# Patient Record
Sex: MALE | Race: WHITE | Employment: FULL TIME | URBAN - METROPOLITAN AREA
[De-identification: names, ages, dates, MRNs, and addresses within clinical notes are randomized per-mention and may not be internally consistent; named-entity substitution may affect disease eponyms.]

---

## 2018-05-02 ENCOUNTER — HOSPITAL ENCOUNTER (EMERGENCY)
Age: 33
Discharge: HOME | End: 2018-05-02
Attending: EMERGENCY MEDICINE
Payer: MEDICAID

## 2018-05-02 VITALS
HEIGHT: 67 IN | BODY MASS INDEX: 28.72 KG/M2 | SYSTOLIC BLOOD PRESSURE: 148 MMHG | DIASTOLIC BLOOD PRESSURE: 89 MMHG | OXYGEN SATURATION: 96 % | WEIGHT: 183 LBS | RESPIRATION RATE: 16 BRPM | HEART RATE: 103 BPM | TEMPERATURE: 99.8 F

## 2018-05-02 DIAGNOSIS — J40 BRONCHITIS: Primary | ICD-10-CM

## 2018-05-02 PROCEDURE — 99282 EMERGENCY DEPT VISIT SF MDM: CPT

## 2018-05-02 RX ORDER — BENZONATATE 100 MG/1
100 CAPSULE ORAL 3 TIMES DAILY PRN
Qty: 21 CAPSULE | Refills: 0 | Status: SHIPPED | OUTPATIENT
Start: 2018-05-02 | End: 2018-05-09

## 2018-05-02 RX ORDER — AZITHROMYCIN 250 MG/1
TABLET, FILM COATED ORAL
Qty: 1 PACKET | Refills: 0 | Status: SHIPPED | OUTPATIENT
Start: 2018-05-02 | End: 2018-05-12

## 2018-05-02 RX ORDER — PREDNISONE 50 MG/1
TABLET ORAL
Qty: 5 TABLET | Refills: 0 | Status: SHIPPED | OUTPATIENT
Start: 2018-05-02 | End: 2018-09-04

## 2018-05-02 RX ORDER — ALBUTEROL SULFATE 90 UG/1
2 AEROSOL, METERED RESPIRATORY (INHALATION) EVERY 4 HOURS PRN
Qty: 1 INHALER | Refills: 1 | Status: SHIPPED | OUTPATIENT
Start: 2018-05-02 | End: 2018-09-04

## 2018-05-02 ASSESSMENT — PAIN DESCRIPTION - PAIN TYPE: TYPE: ACUTE PAIN

## 2018-05-02 ASSESSMENT — PAIN SCALES - GENERAL: PAINLEVEL_OUTOF10: 10

## 2018-05-02 ASSESSMENT — PAIN DESCRIPTION - LOCATION: LOCATION: THROAT

## 2018-05-08 ENCOUNTER — HOSPITAL ENCOUNTER (EMERGENCY)
Age: 33
Discharge: HOME | End: 2018-05-09
Attending: EMERGENCY MEDICINE
Payer: MEDICAID

## 2018-05-08 ENCOUNTER — APPOINTMENT (OUTPATIENT)
Dept: GENERAL RADIOLOGY | Age: 33
End: 2018-05-08
Payer: MEDICAID

## 2018-05-08 VITALS
WEIGHT: 183 LBS | OXYGEN SATURATION: 95 % | SYSTOLIC BLOOD PRESSURE: 128 MMHG | HEIGHT: 67 IN | BODY MASS INDEX: 28.72 KG/M2 | RESPIRATION RATE: 16 BRPM | TEMPERATURE: 98.4 F | HEART RATE: 79 BPM | DIASTOLIC BLOOD PRESSURE: 68 MMHG

## 2018-05-08 DIAGNOSIS — J20.8 ACUTE BRONCHITIS DUE TO OTHER SPECIFIED ORGANISMS: Primary | ICD-10-CM

## 2018-05-08 PROCEDURE — 99283 EMERGENCY DEPT VISIT LOW MDM: CPT

## 2018-05-08 PROCEDURE — 71046 X-RAY EXAM CHEST 2 VIEWS: CPT

## 2018-05-08 PROCEDURE — 6370000000 HC RX 637 (ALT 250 FOR IP): Performed by: EMERGENCY MEDICINE

## 2018-05-08 RX ORDER — DOXYCYCLINE HYCLATE 100 MG/1
100 CAPSULE ORAL ONCE
Status: COMPLETED | OUTPATIENT
Start: 2018-05-08 | End: 2018-05-08

## 2018-05-08 RX ORDER — CODEINE PHOSPHATE AND GUAIFENESIN 10; 100 MG/5ML; MG/5ML
10 SOLUTION ORAL ONCE
Status: COMPLETED | OUTPATIENT
Start: 2018-05-08 | End: 2018-05-08

## 2018-05-08 RX ORDER — IPRATROPIUM BROMIDE AND ALBUTEROL SULFATE 2.5; .5 MG/3ML; MG/3ML
1 SOLUTION RESPIRATORY (INHALATION)
Status: COMPLETED | OUTPATIENT
Start: 2018-05-08 | End: 2018-05-09

## 2018-05-08 RX ADMIN — GUAIFENESIN AND CODEINE PHOSPHATE 10 ML: 10; 100 LIQUID ORAL at 23:50

## 2018-05-08 RX ADMIN — DOXYCYCLINE HYCLATE 100 MG: 100 CAPSULE, GELATIN COATED ORAL at 23:50

## 2018-05-08 ASSESSMENT — PAIN DESCRIPTION - LOCATION: LOCATION: CHEST;RIB CAGE;THROAT

## 2018-05-08 ASSESSMENT — PAIN SCALES - GENERAL: PAINLEVEL_OUTOF10: 10

## 2018-05-08 ASSESSMENT — PAIN DESCRIPTION - ONSET: ONSET: ON-GOING

## 2018-05-08 ASSESSMENT — PAIN DESCRIPTION - DESCRIPTORS: DESCRIPTORS: ACHING;CONSTANT;DISCOMFORT

## 2018-05-08 ASSESSMENT — PAIN DESCRIPTION - FREQUENCY: FREQUENCY: CONTINUOUS

## 2018-05-08 ASSESSMENT — PAIN DESCRIPTION - PROGRESSION: CLINICAL_PROGRESSION: NOT CHANGED

## 2018-05-08 ASSESSMENT — PAIN DESCRIPTION - PAIN TYPE: TYPE: ACUTE PAIN

## 2018-05-09 PROBLEM — J20.8 ACUTE BRONCHITIS DUE TO OTHER SPECIFIED ORGANISMS: Status: ACTIVE | Noted: 2018-05-09

## 2018-05-09 PROCEDURE — 6370000000 HC RX 637 (ALT 250 FOR IP): Performed by: EMERGENCY MEDICINE

## 2018-05-09 RX ORDER — GUAIFENESIN AND CODEINE PHOSPHATE 100; 10 MG/5ML; MG/5ML
10 SOLUTION ORAL 3 TIMES DAILY PRN
Qty: 200 ML | Refills: 0 | Status: SHIPPED | OUTPATIENT
Start: 2018-05-09 | End: 2018-05-16

## 2018-05-09 RX ORDER — DOXYCYCLINE HYCLATE 100 MG
100 TABLET ORAL 2 TIMES DAILY
Qty: 20 TABLET | Refills: 0 | Status: SHIPPED | OUTPATIENT
Start: 2018-05-09 | End: 2018-05-19

## 2018-05-09 RX ADMIN — IPRATROPIUM BROMIDE AND ALBUTEROL SULFATE 1 AMPULE: .5; 3 SOLUTION RESPIRATORY (INHALATION) at 00:45

## 2018-05-09 RX ADMIN — IPRATROPIUM BROMIDE AND ALBUTEROL SULFATE 1 AMPULE: .5; 3 SOLUTION RESPIRATORY (INHALATION) at 00:30

## 2018-05-09 RX ADMIN — IPRATROPIUM BROMIDE AND ALBUTEROL SULFATE 1 AMPULE: .5; 3 SOLUTION RESPIRATORY (INHALATION) at 00:12

## 2018-09-04 ENCOUNTER — APPOINTMENT (OUTPATIENT)
Dept: GENERAL RADIOLOGY | Age: 33
End: 2018-09-04
Payer: MEDICAID

## 2018-09-04 ENCOUNTER — APPOINTMENT (OUTPATIENT)
Dept: CT IMAGING | Age: 33
End: 2018-09-04
Payer: MEDICAID

## 2018-09-04 ENCOUNTER — HOSPITAL ENCOUNTER (EMERGENCY)
Age: 33
Discharge: HOME | End: 2018-09-04
Attending: EMERGENCY MEDICINE
Payer: MEDICAID

## 2018-09-04 VITALS
RESPIRATION RATE: 16 BRPM | OXYGEN SATURATION: 100 % | BODY MASS INDEX: 25.11 KG/M2 | WEIGHT: 160 LBS | DIASTOLIC BLOOD PRESSURE: 84 MMHG | HEIGHT: 67 IN | HEART RATE: 98 BPM | SYSTOLIC BLOOD PRESSURE: 117 MMHG | TEMPERATURE: 97.8 F

## 2018-09-04 DIAGNOSIS — F41.1 ANXIETY STATE: ICD-10-CM

## 2018-09-04 DIAGNOSIS — R10.10 PAIN OF UPPER ABDOMEN: Primary | ICD-10-CM

## 2018-09-04 LAB
ALBUMIN SERPL-MCNC: 4.6 G/DL (ref 3.5–5.2)
ALP BLD-CCNC: 62 U/L (ref 40–129)
ALT SERPL-CCNC: 80 U/L (ref 0–40)
ANION GAP SERPL CALCULATED.3IONS-SCNC: 15 MMOL/L (ref 7–16)
AST SERPL-CCNC: 51 U/L (ref 0–39)
BASOPHILS ABSOLUTE: 0.12 E9/L (ref 0–0.2)
BASOPHILS RELATIVE PERCENT: 1.5 % (ref 0–2)
BILIRUB SERPL-MCNC: 0.4 MG/DL (ref 0–1.2)
BUN BLDV-MCNC: 6 MG/DL (ref 6–20)
CALCIUM SERPL-MCNC: 8.8 MG/DL (ref 8.6–10.2)
CHLORIDE BLD-SCNC: 92 MMOL/L (ref 98–107)
CO2: 27 MMOL/L (ref 22–29)
CREAT SERPL-MCNC: 1.1 MG/DL (ref 0.7–1.2)
EKG ATRIAL RATE: 110 BPM
EKG P AXIS: 61 DEGREES
EKG P-R INTERVAL: 164 MS
EKG Q-T INTERVAL: 348 MS
EKG QRS DURATION: 94 MS
EKG QTC CALCULATION (BAZETT): 470 MS
EKG R AXIS: 34 DEGREES
EKG T AXIS: 57 DEGREES
EKG VENTRICULAR RATE: 110 BPM
EOSINOPHILS ABSOLUTE: 0.11 E9/L (ref 0.05–0.5)
EOSINOPHILS RELATIVE PERCENT: 1.4 % (ref 0–6)
GFR AFRICAN AMERICAN: >60
GFR NON-AFRICAN AMERICAN: >60 ML/MIN/1.73
GLUCOSE BLD-MCNC: 91 MG/DL (ref 74–109)
HCT VFR BLD CALC: 39.5 % (ref 37–54)
HEMOGLOBIN: 14.1 G/DL (ref 12.5–16.5)
IMMATURE GRANULOCYTES #: 0.02 E9/L
IMMATURE GRANULOCYTES %: 0.3 % (ref 0–5)
LYMPHOCYTES ABSOLUTE: 3.2 E9/L (ref 1.5–4)
LYMPHOCYTES RELATIVE PERCENT: 40.6 % (ref 20–42)
MCH RBC QN AUTO: 31.9 PG (ref 26–35)
MCHC RBC AUTO-ENTMCNC: 35.7 % (ref 32–34.5)
MCV RBC AUTO: 89.4 FL (ref 80–99.9)
MONOCYTES ABSOLUTE: 0.72 E9/L (ref 0.1–0.95)
MONOCYTES RELATIVE PERCENT: 9.1 % (ref 2–12)
NEUTROPHILS ABSOLUTE: 3.71 E9/L (ref 1.8–7.3)
NEUTROPHILS RELATIVE PERCENT: 47.1 % (ref 43–80)
PDW BLD-RTO: 12.1 FL (ref 11.5–15)
PLATELET # BLD: 225 E9/L (ref 130–450)
PMV BLD AUTO: 9.7 FL (ref 7–12)
POTASSIUM SERPL-SCNC: 2.9 MMOL/L (ref 3.5–5)
RBC # BLD: 4.42 E12/L (ref 3.8–5.8)
SODIUM BLD-SCNC: 134 MMOL/L (ref 132–146)
TOTAL PROTEIN: 7.7 G/DL (ref 6.4–8.3)
TROPONIN: <0.01 NG/ML (ref 0–0.03)
WBC # BLD: 7.9 E9/L (ref 4.5–11.5)

## 2018-09-04 PROCEDURE — 85025 COMPLETE CBC W/AUTO DIFF WBC: CPT

## 2018-09-04 PROCEDURE — 6370000000 HC RX 637 (ALT 250 FOR IP): Performed by: EMERGENCY MEDICINE

## 2018-09-04 PROCEDURE — 36415 COLL VENOUS BLD VENIPUNCTURE: CPT

## 2018-09-04 PROCEDURE — 80053 COMPREHEN METABOLIC PANEL: CPT

## 2018-09-04 PROCEDURE — 6360000002 HC RX W HCPCS

## 2018-09-04 PROCEDURE — 74176 CT ABD & PELVIS W/O CONTRAST: CPT

## 2018-09-04 PROCEDURE — 71250 CT THORAX DX C-: CPT

## 2018-09-04 PROCEDURE — 99285 EMERGENCY DEPT VISIT HI MDM: CPT

## 2018-09-04 PROCEDURE — 96374 THER/PROPH/DIAG INJ IV PUSH: CPT

## 2018-09-04 PROCEDURE — 84484 ASSAY OF TROPONIN QUANT: CPT

## 2018-09-04 PROCEDURE — 71045 X-RAY EXAM CHEST 1 VIEW: CPT

## 2018-09-04 RX ORDER — DOCUSATE SODIUM 100 MG/1
100 CAPSULE, LIQUID FILLED ORAL 2 TIMES DAILY
Qty: 10 CAPSULE | Refills: 0 | Status: SHIPPED | OUTPATIENT
Start: 2018-09-04 | End: 2018-09-09

## 2018-09-04 RX ORDER — LORAZEPAM 2 MG/ML
INJECTION INTRAMUSCULAR
Status: COMPLETED
Start: 2018-09-04 | End: 2018-09-04

## 2018-09-04 RX ORDER — LORAZEPAM 1 MG/1
1 TABLET ORAL EVERY 6 HOURS PRN
Qty: 6 TABLET | Refills: 0 | Status: SHIPPED | OUTPATIENT
Start: 2018-09-04 | End: 2018-09-07

## 2018-09-04 RX ORDER — LORAZEPAM 2 MG/ML
1 INJECTION INTRAMUSCULAR ONCE
Status: COMPLETED | OUTPATIENT
Start: 2018-09-04 | End: 2018-09-04

## 2018-09-04 RX ORDER — BUPRENORPHINE AND NALOXONE 8; 2 MG/1; MG/1
1 FILM, SOLUBLE BUCCAL; SUBLINGUAL DAILY
COMMUNITY

## 2018-09-04 RX ORDER — POTASSIUM CHLORIDE 20 MEQ/1
40 TABLET, EXTENDED RELEASE ORAL ONCE
Status: COMPLETED | OUTPATIENT
Start: 2018-09-04 | End: 2018-09-04

## 2018-09-04 RX ORDER — FAMOTIDINE 20 MG/1
20 TABLET, FILM COATED ORAL 2 TIMES DAILY
Qty: 14 TABLET | Refills: 0 | Status: SHIPPED | OUTPATIENT
Start: 2018-09-04 | End: 2018-09-11

## 2018-09-04 RX ADMIN — LORAZEPAM 1 MG: 2 INJECTION INTRAMUSCULAR at 05:17

## 2018-09-04 RX ADMIN — LORAZEPAM 1 MG: 2 INJECTION INTRAMUSCULAR; INTRAVENOUS at 05:17

## 2018-09-04 RX ADMIN — POTASSIUM CHLORIDE 40 MEQ: 20 TABLET, EXTENDED RELEASE ORAL at 06:30

## 2018-09-04 ASSESSMENT — PAIN DESCRIPTION - PAIN TYPE: TYPE: ACUTE PAIN

## 2018-09-04 ASSESSMENT — PAIN DESCRIPTION - ONSET: ONSET: PROGRESSIVE

## 2018-09-04 ASSESSMENT — PAIN DESCRIPTION - ORIENTATION: ORIENTATION: MID;LEFT

## 2018-09-04 ASSESSMENT — PAIN SCALES - GENERAL: PAINLEVEL_OUTOF10: 10

## 2018-09-04 ASSESSMENT — PAIN DESCRIPTION - DESCRIPTORS: DESCRIPTORS: ACHING;CONSTANT

## 2018-09-04 ASSESSMENT — PAIN DESCRIPTION - LOCATION: LOCATION: CHEST

## 2018-09-04 ASSESSMENT — PAIN DESCRIPTION - FREQUENCY: FREQUENCY: CONTINUOUS

## 2019-04-25 ENCOUNTER — HOSPITAL ENCOUNTER (EMERGENCY)
Age: 34
Discharge: HOME | End: 2019-04-25
Attending: EMERGENCY MEDICINE
Payer: MEDICAID

## 2019-04-25 VITALS
DIASTOLIC BLOOD PRESSURE: 87 MMHG | HEART RATE: 126 BPM | WEIGHT: 187 LBS | HEIGHT: 68 IN | BODY MASS INDEX: 28.34 KG/M2 | TEMPERATURE: 97.5 F | SYSTOLIC BLOOD PRESSURE: 140 MMHG | RESPIRATION RATE: 18 BRPM | OXYGEN SATURATION: 93 %

## 2019-04-25 DIAGNOSIS — T40.601A NARCOTIC OVERDOSE, ACCIDENTAL OR UNINTENTIONAL, INITIAL ENCOUNTER (HCC): Primary | ICD-10-CM

## 2019-04-25 PROCEDURE — 99284 EMERGENCY DEPT VISIT MOD MDM: CPT

## 2019-04-25 ASSESSMENT — ENCOUNTER SYMPTOMS
DIARRHEA: 0
NAUSEA: 0
SINUS PRESSURE: 0
SORE THROAT: 0
EYE DISCHARGE: 0
SHORTNESS OF BREATH: 0
VOMITING: 0
WHEEZING: 0
BACK PAIN: 0
EYE REDNESS: 0
EYE PAIN: 0
COUGH: 0
ABDOMINAL PAIN: 0
INGESTION: 1

## 2019-04-26 NOTE — ED NOTES
Patient AOx4, he is answering questions and responding appropriately. Patient refusing to allow RN to assess him and refusing treatment. Patient is waiting in room for a ride from his brother.       Marva Menjivar RN  04/25/19 7342

## 2019-04-26 NOTE — ED NOTES
Bed: 08  Expected date:   Expected time:   Means of arrival:   Comments:  EMS overdose     [place], [facility]  04/25/19 2053

## 2019-04-26 NOTE — ED PROVIDER NOTES
74-year-old male presents to the emergency department after an accidental overdose. Patient was at [employer] and had drank a couple beers was watching the [place] draft when he said he was having a headache so he took a friend's Opana pain medication he was became unresponsive and received a dose of Narcan which immediately let him to become alert and awake and had no difficulty breathing. States this is an accidental overdose. He denies suicidal or homicidal intention he states he does not want to stay because he is fine and is annoyed that he is here. States his brother is in the parking lot waiting for him. The history is provided by the patient. Ingestion   Ingested substance:  Prescription medication  Time since incident:  20 minutes  Ingestion amount:  1 Opana pill  Witnesses present: no    Called poison control: no    Incident location:  Party  Context: intentional ingestion    Context: not suicide attempt    Associated symptoms: no abdominal pain, no chest pain, no cough, no diarrhea, no headaches, no nausea, no shortness of breath and no vomiting        Review of Systems   Constitutional: Negative for chills and fever. HENT: Negative for ear pain, sinus pressure and sore throat. Eyes: Negative for pain, discharge and redness. Respiratory: Negative for cough, shortness of breath and wheezing. Cardiovascular: Negative for chest pain. Gastrointestinal: Negative for abdominal pain, diarrhea, nausea and vomiting. Genitourinary: Negative for dysuria and frequency. Musculoskeletal: Negative for arthralgias and back pain. Skin: Negative for rash and wound. Neurological: Negative for weakness and headaches. Hematological: Negative for adenopathy. All other systems reviewed and are negative. Physical Exam   Constitutional: He is oriented to person, place, and time. He appears well-developed and well-nourished. No distress. HENT:   Head: Normocephalic and atraumatic.    Eyes: Pupils are equal, round, and reactive to light. EOM are normal.   Neck: Normal range of motion. Neck supple. Cardiovascular: Normal rate and regular rhythm. Pulmonary/Chest: Effort normal and breath sounds normal.   Abdominal: Soft. Bowel sounds are normal.   Musculoskeletal: Normal range of motion. He exhibits no edema. Neurological: He is alert and oriented to person, place, and time. Psychiatric: He expresses no homicidal and no suicidal ideation. He expresses no suicidal plans and no homicidal plans. Procedures    MDM    ED Course as of Apr 25 2110   Thu Apr 25, 2019 2107 She has seen and examined patient appears to be an accident overdose of Opana. Patient is alert and awake and oriented and answering questions appropriately. He states he does not want to be seen and evaluated. Patient states he has no homicidal or suicidal ideation. .  Discussed because the patient had a overdose is possible that the symptoms could return especially since Opana last longer nervous system than Narcan. This could lead to the overdose like symptoms returning patient states understanding of this he also is explained that if he is to return to overdose like symptoms this could lead to, or possibly death. Patient again states understanding this and is willing to sign [employer] paperwork. Agent signed [employer] paperwork and went home with his brother who was available in the emergency department. [CF]   2109 ATTENDING PROVIDER ATTESTATION:     Augustine Boogie presented to the emergency department for evaluation of [unfilled] and was initially evaluated by the Medical Resident. See Original ED Note for H&P and ED course above. I have reviewed and discussed the case, including pertinent history (medical, surgical, family and social) and exam findings with the Medical Resident assigned to Augustine Boogie.   I have personally performed and/or participated in the history, exam, medical decision making, and diagnosis and prognosis. Their questions are answered, however they are not agreeable to admission.     --------------------------------- ADDITIONAL PROVIDER NOTES ---------------------------------  This patient has chosen to leave against medical advice. I have personally explained to them that choosing to do so may result in permanent bodily harm or death. I discussed at length that without further evaluation and monitoring there may be unforeseen circumstances and deterioration resulting in permanent bodily harm or death as a result of their choice. They are alert, oriented, and competent at this time. They state that they are aware of the serious risks as explained, but they continue to wish to leave against medical   advice. In light of their decision to leave against medical advice, follow-up has been arranged and they are aware of the importance of following up as instructed. They have been advised that they should return to the ED immediately if they change their mind at any time, or if their condition begins to change or worsen. The follow up plan has been discussed in detail and they are aware of the specific conditions for emergent return, as well as the importance of follow-up. New Prescriptions    No medications on file       Diagnosis:  1. Narcotic overdose, accidental or unintentional, initial encounter [facility])        Disposition:  Patient's disposition: Left against medical advice. Patient's condition is stable.        Aston Horn DO  Resident  04/25/19 1614

## 2019-04-26 NOTE — ED NOTES
Patient left room and is waiting outside for a ride from his brother. Merary Brandon witnessed that patient called brother for a ride home.       Lawson Felty, RN  04/25/19 9167

## 2019-08-18 ENCOUNTER — HOSPITAL ENCOUNTER (EMERGENCY)
Age: 34
Discharge: HOME | End: 2019-08-18
Payer: MEDICAID

## 2019-08-18 VITALS
HEIGHT: 68 IN | SYSTOLIC BLOOD PRESSURE: 118 MMHG | OXYGEN SATURATION: 94 % | TEMPERATURE: 98.2 F | WEIGHT: 170 LBS | BODY MASS INDEX: 25.76 KG/M2 | DIASTOLIC BLOOD PRESSURE: 68 MMHG | HEART RATE: 103 BPM | RESPIRATION RATE: 16 BRPM

## 2019-08-18 DIAGNOSIS — Z91.89 AT RISK FOR METHICILLIN RESISTANT STAPHYLOCOCCUS AUREUS INFECTION: Primary | ICD-10-CM

## 2019-08-18 DIAGNOSIS — L02.91 ABSCESS: ICD-10-CM

## 2019-08-18 PROCEDURE — 6370000000 HC RX 637 (ALT 250 FOR IP)

## 2019-08-18 PROCEDURE — 99282 EMERGENCY DEPT VISIT SF MDM: CPT

## 2019-08-18 PROCEDURE — 6370000000 HC RX 637 (ALT 250 FOR IP): Performed by: NURSE PRACTITIONER

## 2019-08-18 RX ORDER — SULFAMETHOXAZOLE AND TRIMETHOPRIM 800; 160 MG/1; MG/1
2 TABLET ORAL 2 TIMES DAILY
Qty: 40 TABLET | Refills: 0 | Status: SHIPPED | OUTPATIENT
Start: 2019-08-18 | End: 2019-08-28

## 2019-08-18 RX ORDER — SULFAMETHOXAZOLE AND TRIMETHOPRIM 800; 160 MG/1; MG/1
2 TABLET ORAL ONCE
Status: COMPLETED | OUTPATIENT
Start: 2019-08-18 | End: 2019-08-18

## 2019-08-18 RX ORDER — DIAPER,BRIEF,INFANT-TODD,DISP
EACH MISCELLANEOUS
Status: COMPLETED
Start: 2019-08-18 | End: 2019-08-18

## 2019-08-18 RX ADMIN — BACITRACIN ZINC: 500 OINTMENT TOPICAL at 02:04

## 2019-08-18 RX ADMIN — SULFAMETHOXAZOLE AND TRIMETHOPRIM 2 TABLET: 800; 160 TABLET ORAL at 02:03

## 2019-08-18 ASSESSMENT — PAIN DESCRIPTION - LOCATION: LOCATION: ARM

## 2019-08-18 ASSESSMENT — PAIN SCALES - GENERAL: PAINLEVEL_OUTOF10: 8

## 2019-08-18 ASSESSMENT — PAIN DESCRIPTION - PAIN TYPE: TYPE: ACUTE PAIN

## 2019-08-18 NOTE — ED NOTES
Discharge instructions reviewed , including diagnosis, medications, follow up appointments, home care, and also when to call 911. All discharge instructions questions answered.        Pt left ED ambulatory         Dyllan Trejo RN  08/18/19 0648

## 2019-08-18 NOTE — ED PROVIDER NOTES
Independent  HPI:  8/18/19, Time: 2:15 AM         Kenroy Chicas is a 27 y.o. male presenting to the ED for small areas of redness noted to his upper arms small area to the back of his neck and some to his legs. Patient reports past history of drug abuse denies any drug abuse now. But patient also reports past history of MRSA skin infections when he was in high school and was concerned for reoccurrence. Patient denies any fevers with this. Patient does have scabbing to the sites. Patient without any drainage, streaking there is also no associated induration or fluctuance. Review of Systems:   Pertinent positives and negatives are stated within HPI, all other systems reviewed and are negative.          --------------------------------------------- PAST HISTORY ---------------------------------------------  Past Medical History:  has a past medical history of Cervical back pain with evidence of disc disease, Migraines, and Ruptured lumbar disc. Past Surgical History:  has no past surgical history on file. Social History:  reports that he has been smoking. He has been smoking about 0.50 packs per day. He has never used smokeless tobacco. He reports that he does not drink alcohol or use drugs. Family History: family history is not on file. The patients home medications have been reviewed. Allergies: Pcn [penicillins]    -------------------------------------------------- RESULTS -------------------------------------------------  All laboratory and radiology results have been personally reviewed by myself   LABS:  No results found for this visit on 08/18/19. RADIOLOGY:  Interpreted by Radiologist.  No orders to display       ------------------------- NURSING NOTES AND VITALS REVIEWED ---------------------------   The nursing notes within the ED encounter and vital signs as below have been reviewed.    /68   Pulse 103   Temp 98.2 °F (36.8 °C) (Tympanic)   Resp 16   Ht 5' 8\" (1.727 m)

## 2022-01-30 ENCOUNTER — HOSPITAL ENCOUNTER (EMERGENCY)
Age: 37
Discharge: HOME | End: 2022-01-30
Attending: STUDENT IN AN ORGANIZED HEALTH CARE EDUCATION/TRAINING PROGRAM
Payer: MEDICAID

## 2022-01-30 ENCOUNTER — APPOINTMENT (OUTPATIENT)
Dept: CT IMAGING | Age: 37
End: 2022-01-30
Payer: MEDICAID

## 2022-01-30 VITALS
OXYGEN SATURATION: 98 % | HEART RATE: 93 BPM | WEIGHT: 170 LBS | BODY MASS INDEX: 26.68 KG/M2 | TEMPERATURE: 98.7 F | SYSTOLIC BLOOD PRESSURE: 168 MMHG | DIASTOLIC BLOOD PRESSURE: 97 MMHG | HEIGHT: 67 IN | RESPIRATION RATE: 16 BRPM

## 2022-01-30 DIAGNOSIS — K13.0 ABSCESS OF LIP: Primary | ICD-10-CM

## 2022-01-30 LAB
ALBUMIN SERPL-MCNC: 4.6 G/DL (ref 3.5–5.2)
ALP BLD-CCNC: 108 U/L (ref 40–129)
ALT SERPL-CCNC: 55 U/L (ref 0–40)
ANION GAP SERPL CALCULATED.3IONS-SCNC: 16 MMOL/L (ref 7–16)
AST SERPL-CCNC: 34 U/L (ref 0–39)
BASOPHILS ABSOLUTE: 0.07 E9/L (ref 0–0.2)
BASOPHILS RELATIVE PERCENT: 0.6 % (ref 0–2)
BILIRUB SERPL-MCNC: 0.9 MG/DL (ref 0–1.2)
BUN BLDV-MCNC: 8 MG/DL (ref 6–20)
CALCIUM SERPL-MCNC: 9.4 MG/DL (ref 8.6–10.2)
CHLORIDE BLD-SCNC: 96 MMOL/L (ref 98–107)
CO2: 26 MMOL/L (ref 22–29)
CREAT SERPL-MCNC: 0.8 MG/DL (ref 0.7–1.2)
EOSINOPHILS ABSOLUTE: 0.1 E9/L (ref 0.05–0.5)
EOSINOPHILS RELATIVE PERCENT: 0.9 % (ref 0–6)
GFR AFRICAN AMERICAN: >60
GFR NON-AFRICAN AMERICAN: >60 ML/MIN/1.73
GLUCOSE BLD-MCNC: 98 MG/DL (ref 74–99)
HCT VFR BLD CALC: 45.3 % (ref 37–54)
HEMOGLOBIN: 15.2 G/DL (ref 12.5–16.5)
IMMATURE GRANULOCYTES #: 0.04 E9/L
IMMATURE GRANULOCYTES %: 0.3 % (ref 0–5)
LACTIC ACID: 1.7 MMOL/L (ref 0.5–2.2)
LYMPHOCYTES ABSOLUTE: 2.83 E9/L (ref 1.5–4)
LYMPHOCYTES RELATIVE PERCENT: 24.6 % (ref 20–42)
MCH RBC QN AUTO: 31.5 PG (ref 26–35)
MCHC RBC AUTO-ENTMCNC: 33.6 % (ref 32–34.5)
MCV RBC AUTO: 93.8 FL (ref 80–99.9)
METER GLUCOSE: 94 MG/DL (ref 74–99)
MONOCYTES ABSOLUTE: 0.69 E9/L (ref 0.1–0.95)
MONOCYTES RELATIVE PERCENT: 6 % (ref 2–12)
NEUTROPHILS ABSOLUTE: 7.79 E9/L (ref 1.8–7.3)
NEUTROPHILS RELATIVE PERCENT: 67.6 % (ref 43–80)
PDW BLD-RTO: 12.3 FL (ref 11.5–15)
PLATELET # BLD: 288 E9/L (ref 130–450)
PMV BLD AUTO: 10 FL (ref 7–12)
POTASSIUM REFLEX MAGNESIUM: 3.7 MMOL/L (ref 3.5–5)
RBC # BLD: 4.83 E12/L (ref 3.8–5.8)
SEDIMENTATION RATE, ERYTHROCYTE: 42 MM/HR (ref 0–15)
SODIUM BLD-SCNC: 138 MMOL/L (ref 132–146)
TOTAL PROTEIN: 8.8 G/DL (ref 6.4–8.3)
WBC # BLD: 11.5 E9/L (ref 4.5–11.5)

## 2022-01-30 PROCEDURE — 80053 COMPREHEN METABOLIC PANEL: CPT

## 2022-01-30 PROCEDURE — 2580000003 HC RX 258

## 2022-01-30 PROCEDURE — 6360000004 HC RX CONTRAST MEDICATION: Performed by: RADIOLOGY

## 2022-01-30 PROCEDURE — 99284 EMERGENCY DEPT VISIT MOD MDM: CPT

## 2022-01-30 PROCEDURE — 70487 CT MAXILLOFACIAL W/DYE: CPT

## 2022-01-30 PROCEDURE — 96375 TX/PRO/DX INJ NEW DRUG ADDON: CPT

## 2022-01-30 PROCEDURE — 96374 THER/PROPH/DIAG INJ IV PUSH: CPT

## 2022-01-30 PROCEDURE — 6370000000 HC RX 637 (ALT 250 FOR IP): Performed by: STUDENT IN AN ORGANIZED HEALTH CARE EDUCATION/TRAINING PROGRAM

## 2022-01-30 PROCEDURE — 86140 C-REACTIVE PROTEIN: CPT

## 2022-01-30 PROCEDURE — 83605 ASSAY OF LACTIC ACID: CPT

## 2022-01-30 PROCEDURE — 87186 SC STD MICRODIL/AGAR DIL: CPT

## 2022-01-30 PROCEDURE — 87070 CULTURE OTHR SPECIMN AEROBIC: CPT

## 2022-01-30 PROCEDURE — 85025 COMPLETE CBC W/AUTO DIFF WBC: CPT

## 2022-01-30 PROCEDURE — 85651 RBC SED RATE NONAUTOMATED: CPT

## 2022-01-30 PROCEDURE — 87077 CULTURE AEROBIC IDENTIFY: CPT

## 2022-01-30 PROCEDURE — 6360000002 HC RX W HCPCS

## 2022-01-30 PROCEDURE — 82962 GLUCOSE BLOOD TEST: CPT

## 2022-01-30 PROCEDURE — 10061 I&D ABSCESS COMP/MULTIPLE: CPT

## 2022-01-30 PROCEDURE — 87040 BLOOD CULTURE FOR BACTERIA: CPT

## 2022-01-30 RX ORDER — PREDNISONE 20 MG/1
40 TABLET ORAL ONCE
Status: COMPLETED | OUTPATIENT
Start: 2022-01-30 | End: 2022-01-30

## 2022-01-30 RX ORDER — DOXYCYCLINE HYCLATE 100 MG/1
100 CAPSULE ORAL ONCE
Status: COMPLETED | OUTPATIENT
Start: 2022-01-30 | End: 2022-01-30

## 2022-01-30 RX ORDER — PREDNISONE 20 MG/1
40 TABLET ORAL DAILY
Qty: 10 TABLET | Refills: 0 | Status: SHIPPED | OUTPATIENT
Start: 2022-01-30 | End: 2022-02-04

## 2022-01-30 RX ORDER — 0.9 % SODIUM CHLORIDE 0.9 %
1000 INTRAVENOUS SOLUTION INTRAVENOUS ONCE
Status: COMPLETED | OUTPATIENT
Start: 2022-01-30 | End: 2022-01-30

## 2022-01-30 RX ORDER — DOXYCYCLINE HYCLATE 100 MG/1
100 CAPSULE ORAL 2 TIMES DAILY
Qty: 20 CAPSULE | Refills: 0 | Status: SHIPPED | OUTPATIENT
Start: 2022-01-30 | End: 2022-02-09

## 2022-01-30 RX ORDER — KETOROLAC TROMETHAMINE 30 MG/ML
15 INJECTION, SOLUTION INTRAMUSCULAR; INTRAVENOUS ONCE
Status: COMPLETED | OUTPATIENT
Start: 2022-01-30 | End: 2022-01-30

## 2022-01-30 RX ORDER — FENTANYL CITRATE 50 UG/ML
50 INJECTION, SOLUTION INTRAMUSCULAR; INTRAVENOUS ONCE
Status: COMPLETED | OUTPATIENT
Start: 2022-01-30 | End: 2022-01-30

## 2022-01-30 RX ADMIN — IOPAMIDOL 75 ML: 755 INJECTION, SOLUTION INTRAVENOUS at 20:20

## 2022-01-30 RX ADMIN — PREDNISONE 40 MG: 20 TABLET ORAL at 21:45

## 2022-01-30 RX ADMIN — FENTANYL CITRATE 50 MCG: 50 INJECTION, SOLUTION INTRAMUSCULAR; INTRAVENOUS at 21:20

## 2022-01-30 RX ADMIN — SODIUM CHLORIDE 1000 ML: 9 INJECTION, SOLUTION INTRAVENOUS at 19:42

## 2022-01-30 RX ADMIN — DOXYCYCLINE HYCLATE 100 MG: 100 CAPSULE ORAL at 21:45

## 2022-01-30 RX ADMIN — KETOROLAC TROMETHAMINE 15 MG: 30 INJECTION, SOLUTION INTRAMUSCULAR at 19:40

## 2022-01-30 ASSESSMENT — PAIN SCALES - GENERAL
PAINLEVEL_OUTOF10: 10

## 2022-01-30 ASSESSMENT — PAIN DESCRIPTION - PAIN TYPE: TYPE: ACUTE PAIN

## 2022-01-31 LAB — C-REACTIVE PROTEIN: 20.1 MG/DL (ref 0–0.4)

## 2022-01-31 NOTE — ED NOTES
Patient refusing second set of blood cultures to be drawn. Patient gets diaphoretic and anxious during blood draws.      [address] RN  01/30/22 2663

## 2022-01-31 NOTE — ED PROVIDER NOTES
ATTENDING PROVIDER ATTESTATION:     Maribell Jung presented to the emergency department for evaluation of Abscess (abscess on lip, onset monday, started antibiotics on wed, getting worse )    I have reviewed and discussed the case, including pertinent history (medical, surgical, family and social) and exam findings with the Midlevel and the Nurse assigned to Maribell Jung. I have personally performed and/or participated in the history, exam, medical decision making, and procedures and agree with all pertinent clinical information. I have reviewed my findings and recommendations with Maribell Jung and members of family present at the time of disposition. My findings/plan: The encounter diagnosis was Abscess of lip. New Prescriptions    DOXYCYCLINE HYCLATE (VIBRAMYCIN) 100 MG CAPSULE    Take 1 capsule by mouth 2 times daily for 10 days    PREDNISONE (DELTASONE) 20 MG TABLET    Take 2 tablets by mouth daily for 5 days     Wanda Figueredo DO      HPI:  1/30/22, Time: 7:25 PM EST         Maribell Jung is a 39 y.o. male presenting to the ED for abscess, beginning 6 days ago. The complaint has been constant, severe in severity, and worsened by nothing. Patient states that on Monday he noticed an abscess to his upper lip. He states that on Wednesday he was seen at the urgent care, and was started on clindamycin and mupirocin ointment. Patient states that the swelling and pain have gotten significantly worse. He denies fevers, but states that he has had intermittent chills. Denies abdominal pain, vomiting, or diarrhea. Denies chest pain or shortness of breath.     Review of Systems:   A complete review of systems was performed and pertinent positives and negatives are stated within HPI, all other systems reviewed and are negative.          --------------------------------------------- PAST HISTORY ---------------------------------------------  Past Medical History:  has a past medical history of Cervical back pain with evidence of disc disease, Migraines, and Ruptured lumbar disc. Past Surgical History:  has no past surgical history on file. Social History:  reports that he has been smoking. He has been smoking about 0.50 packs per day. He has never used smokeless tobacco. He reports that he does not drink alcohol and does not use drugs. Family History: family history is not on file. The patients home medications have been reviewed.     Allergies: Pcn [penicillins]    -------------------------------------------------- RESULTS -------------------------------------------------  All laboratory and radiology results have been personally reviewed by myself   LABS:  Results for orders placed or performed during the hospital encounter of 01/30/22   CBC WITH AUTO DIFFERENTIAL   Result Value Ref Range    WBC 11.5 4.5 - 11.5 E9/L    RBC 4.83 3.80 - 5.80 E12/L    Hemoglobin 15.2 12.5 - 16.5 g/dL    Hematocrit 45.3 37.0 - 54.0 %    MCV 93.8 80.0 - 99.9 fL    MCH 31.5 26.0 - 35.0 pg    MCHC 33.6 32.0 - 34.5 %    RDW 12.3 11.5 - 15.0 fL    Platelets 866 551 - 467 E9/L    MPV 10.0 7.0 - 12.0 fL    Neutrophils % 67.6 43.0 - 80.0 %    Immature Granulocytes % 0.3 0.0 - 5.0 %    Lymphocytes % 24.6 20.0 - 42.0 %    Monocytes % 6.0 2.0 - 12.0 %    Eosinophils % 0.9 0.0 - 6.0 %    Basophils % 0.6 0.0 - 2.0 %    Neutrophils Absolute 7.79 (H) 1.80 - 7.30 E9/L    Immature Granulocytes # 0.04 E9/L    Lymphocytes Absolute 2.83 1.50 - 4.00 E9/L    Monocytes Absolute 0.69 0.10 - 0.95 E9/L    Eosinophils Absolute 0.10 0.05 - 0.50 E9/L    Basophils Absolute 0.07 0.00 - 0.20 E9/L   Comprehensive Metabolic Panel w/ Reflex to MG   Result Value Ref Range    Sodium 138 132 - 146 mmol/L    Potassium reflex Magnesium 3.7 3.5 - 5.0 mmol/L    Chloride 96 (L) 98 - 107 mmol/L    CO2 26 22 - 29 mmol/L    Anion Gap 16 7 - 16 mmol/L    Glucose 98 74 - 99 mg/dL    BUN 8 6 - 20 mg/dL    CREATININE 0.8 0.7 - 1.2 mg/dL    GFR Non-African American >60 >=60 mL/min/1.73    GFR African American >60     Calcium 9.4 8.6 - 10.2 mg/dL    Total Protein 8.8 (H) 6.4 - 8.3 g/dL    Albumin 4.6 3.5 - 5.2 g/dL    Total Bilirubin 0.9 0.0 - 1.2 mg/dL    Alkaline Phosphatase 108 40 - 129 U/L    ALT 55 (H) 0 - 40 U/L    AST 34 0 - 39 U/L   Sedimentation Rate   Result Value Ref Range    Sed Rate 42 (H) 0 - 15 mm/Hr   LACTIC ACID, PLASMA   Result Value Ref Range    Lactic Acid 1.7 0.5 - 2.2 mmol/L   POCT Glucose   Result Value Ref Range    Meter Glucose 94 74 - 99 mg/dL       RADIOLOGY:  Interpreted by Radiologist.  CT FACIAL BONES W CONTRAST   Final Result   Irregular fluid collection located in upper lip soft tissue measures 1.2 x   3.2 cm  related to abscess. ------------------------- NURSING NOTES AND VITALS REVIEWED ---------------------------   The nursing notes within the ED encounter and vital signs as below have been reviewed. /74   Pulse 116   Temp 98.7 °F (37.1 °C) (Temporal)   Resp 16   Ht 5' 7\" (1.702 m)   Wt 170 lb (77.1 kg)   SpO2 99%   BMI 26.63 kg/m²   Oxygen Saturation Interpretation: Normal      ---------------------------------------------------PHYSICAL EXAM--------------------------------------    Physical Exam  Constitutional:       Appearance: Normal appearance. HENT:      Head: Normocephalic. Jaw: No trismus or malocclusion. Mouth/Throat:      Lips: Lesions present. Mouth: Mucous membranes are dry. Dentition: Dental caries present. Tongue: No lesions. Tongue does not deviate from midline. Pharynx: Oropharynx is clear. Uvula midline. No pharyngeal swelling or oropharyngeal exudate. Tonsils: No tonsillar exudate or tonsillar abscesses. Comments: Swelling to the upper lip with moderate surrounding erythema. Induration noted to the upper lip without fluctuance. Honey crusted lesion noted to the upper lip with small amount of yellow drainage.    See attached image  Cardiovascular:      Rate and Rhythm: Normal rate and regular rhythm. Pulses: Normal pulses. Heart sounds: Normal heart sounds. No murmur heard. Pulmonary:      Effort: Pulmonary effort is normal. No respiratory distress. Breath sounds: Normal breath sounds. Abdominal:      General: Abdomen is flat. There is no distension. Palpations: Abdomen is soft. Tenderness: There is no abdominal tenderness. Musculoskeletal:         General: Normal range of motion. Cervical back: Normal range of motion and neck supple. No rigidity or tenderness. Skin:     Capillary Refill: Capillary refill takes less than 2 seconds. Neurological:      Mental Status: He is alert and oriented to person, place, and time. Psychiatric:         Behavior: Behavior normal.         Incision and Drainage Procedure Note  Indication: Abscess  Procedure: The patient was positioned appropriately and the skin over the incision site was prepped with Shur-Clens. Local anesthesia was obtained by infiltration using 1% Lidocaine without epinephrine. Attempted to needle aspirate first from the mucosal side and then externally. Upon injecting lidocaine there was some fluid noted coming from the upper lip. Unable to aspirate any fluid from either of the two approaches.   The patient tolerated the procedure with difficulty, pain and no relief  Complications: None                    ------------------------------ ED COURSE/MEDICAL DECISION MAKING----------------------  Medications   ketorolac (TORADOL) injection 15 mg (15 mg IntraVENous Given 1/30/22 1940)   0.9 % sodium chloride bolus (0 mLs IntraVENous Stopped 1/30/22 2043)   iopamidol (ISOVUE-370) 76 % injection 75 mL (75 mLs IntraVENous Given 1/30/22 2020)   fentaNYL (SUBLIMAZE) injection 50 mcg (50 mcg IntraVENous Given 1/30/22 2120)   doxycycline hyclate (VIBRAMYCIN) capsule 100 mg (100 mg Oral Given 1/30/22 2145)   predniSONE (DELTASONE) tablet 40 mg (40 mg Oral Given 1/30/22 2145)         ED COURSE:  ED Course as of 01/30/22 2150   Demetrio Aguilar Jan 30, 2022 2131 Attempted to needle aspirate the fluid collection. Went with a mucosal approach as well as near the upper lip. Was unable to aspirate back anything out. [BB]   2131 Spoke with Dr. Cinthya Armstrong ENT, discussed the case. His recommendations are to start the patient on Augmentin for 10-14 days as well as a small dose of steroids. Patient to follow up outpatient at ENT clinic. [BB]      ED Course User Index  [BB] Michelle Gallegos,        Medical Decision Making:    Patient seen and evaluated. He is not septic, no hypotension, leukocytosis or fevers. He does have an elevated ESR. Imaging was obtained which demonstrated irregular fluid collection locate din the upper lip soft tissue measuring 1.2 x 3.2cm related to abscess. There is no swelling of the airway or trismus. Attempted to needle aspirate the abscess, however unsuccessful after 2 attempts. ENT was consulted, states there was also attempted needle aspiration. Recommendation is antibiotic and steroids. The patient is to follow-up with ENT outpatient. Patient does have severe penicillin allergy, notes he swells up. He is already on clindamycin therefore instead gave him doxycycline for second coverage. Given Rx for prednisone burst pack at home. Counseling: The emergency provider has spoken with the patient and discussed todays results, in addition to providing specific details for the plan of care and counseling regarding the diagnosis and prognosis. Questions are answered at this time and they are agreeable with the plan.      --------------------------------- IMPRESSION AND DISPOSITION ---------------------------------    IMPRESSION  1. Abscess of lip        DISPOSITION  Disposition: Discharge to home  Patient condition is stable      NOTE: This report was transcribed using voice recognition software.  Every effort was made to ensure accuracy; however,

## 2022-01-31 NOTE — PROGRESS NOTES
Spoke with Dr. Shazia Velazco, who attempted multiple passes with needle drainage without return of pus. Reviewed CT scan and there is no well defined fluid pocket, appears more diffuse and phlegmonous which could be why needle drainage was unsuccessful. Advised to continue mupirocin ointment and change antibiotic to Augmentin x 10 days with 40 mg prednisone x 5 days and patient can follow up outpatient with ENT as needed.      Electronically signed by Denzel Vincent DO on 1/30/2022 at 9:40 PM

## 2022-02-02 LAB
ORGANISM: ABNORMAL
WOUND/ABSCESS: ABNORMAL

## 2022-02-04 LAB — BLOOD CULTURE, ROUTINE: NORMAL

## 2022-03-26 ENCOUNTER — HOSPITAL ENCOUNTER (EMERGENCY)
Age: 37
Discharge: HOME | End: 2022-03-26
Payer: MEDICAID

## 2022-03-26 VITALS
TEMPERATURE: 97.6 F | HEART RATE: 99 BPM | OXYGEN SATURATION: 100 % | HEIGHT: 67 IN | DIASTOLIC BLOOD PRESSURE: 88 MMHG | RESPIRATION RATE: 15 BRPM | WEIGHT: 180 LBS | SYSTOLIC BLOOD PRESSURE: 136 MMHG | BODY MASS INDEX: 28.25 KG/M2

## 2022-03-26 DIAGNOSIS — J34.0 NOSE CELLULITIS: ICD-10-CM

## 2022-03-26 DIAGNOSIS — Z22.322 MRSA NASAL COLONIZATION: Primary | ICD-10-CM

## 2022-03-26 PROCEDURE — 99284 EMERGENCY DEPT VISIT MOD MDM: CPT

## 2022-03-26 PROCEDURE — 6370000000 HC RX 637 (ALT 250 FOR IP): Performed by: PHYSICIAN ASSISTANT

## 2022-03-26 RX ORDER — SULFAMETHOXAZOLE AND TRIMETHOPRIM 800; 160 MG/1; MG/1
1 TABLET ORAL 2 TIMES DAILY
Qty: 20 TABLET | Refills: 0 | Status: SHIPPED | OUTPATIENT
Start: 2022-03-26 | End: 2022-04-05

## 2022-03-26 RX ORDER — SULFAMETHOXAZOLE AND TRIMETHOPRIM 800; 160 MG/1; MG/1
2 TABLET ORAL ONCE
Status: COMPLETED | OUTPATIENT
Start: 2022-03-26 | End: 2022-03-26

## 2022-03-26 RX ORDER — MUPIROCIN CALCIUM 20 MG/G
CREAM TOPICAL
Qty: 15 G | Refills: 2 | Status: SHIPPED | OUTPATIENT
Start: 2022-03-26 | End: 2022-04-25

## 2022-03-26 RX ORDER — LINEZOLID 600 MG/1
600 TABLET, FILM COATED ORAL ONCE
Status: DISCONTINUED | OUTPATIENT
Start: 2022-03-26 | End: 2022-03-26

## 2022-03-26 RX ORDER — LINEZOLID 600 MG/1
600 TABLET, FILM COATED ORAL 2 TIMES DAILY
Qty: 28 TABLET | Refills: 0 | Status: SHIPPED | OUTPATIENT
Start: 2022-03-26 | End: 2022-03-26

## 2022-03-26 RX ADMIN — SULFAMETHOXAZOLE AND TRIMETHOPRIM 2 TABLET: 800; 160 TABLET ORAL at 17:45

## 2022-03-26 ASSESSMENT — PAIN DESCRIPTION - PAIN TYPE: TYPE: ACUTE PAIN

## 2022-03-26 ASSESSMENT — PAIN - FUNCTIONAL ASSESSMENT: PAIN_FUNCTIONAL_ASSESSMENT: 0-10

## 2022-03-26 ASSESSMENT — PAIN SCALES - GENERAL: PAINLEVEL_OUTOF10: 10

## 2022-03-26 NOTE — ED PROVIDER NOTES
[facility]  Department of Emergency Medicine   ED  Encounter Note  Admit Date/RoomTime: 3/26/2022  4:24 PM  ED Room: 33/33  NAME: Jimbo Childs  : 1985  MRN: [id number]     Chief Complaint:  Other (Patient states he had MRSA on his lip 1 month ago and now has it on his nose)    HISTORY OF PRESENT ILLNESS        Jimbo Childs is a 39 y.o. male who presents to the ED with a complaint of redness and swelling to his nose with concern for infection. In 2022 patient had an upper lip abscess with MRSA infection. From what I can gather reading through his charts and confirmed with patient he initially went to an urgent care. Was prescribed clindamycin and mupirocin. He then came to emergency room and aspiration drainage attempt was unsuccessful. Doxycycline was prescribed with continuation of mupirocin. Patient then followed up with ENT who was able to incise and drain it. Patient was referred to infectious disease and with positive cultures of MRSA he was prescribed Zyvox. Patient states Zyvox is the only antibiotic that helped and he wants that prescribed again. Patient states that the redness and swelling started to his nose yesterday. He believes it was when he was trimming his nose hairs he must have nicked the inside of his left nostril. He denies any fevers or chills. Denies any sores or abscess to his lips again. Denies any lesions inside his mouth. Denies any rash to his body. He rates the discomfort from the swelling a 10 out of 10. ROS   Pertinent positives and negatives are stated within HPI, all other systems reviewed and are negative. Past Medical History:  has a past medical history of Cervical back pain with evidence of disc disease, Migraines, and Ruptured lumbar disc. Surgical History:  has no past surgical history on file. Social History:  reports that he has been smoking.  He has been smoking about 0.50 packs per day. He has never used smokeless tobacco. He reports that he does not drink alcohol and does not use drugs. Family History: family history is not on file. Allergies: Pcn [penicillins]    PHYSICAL EXAM   Oxygen Saturation Interpretation: Normal on room air analysis. ED Triage Vitals [03/26/22 1624]   BP Temp Temp src Pulse Resp SpO2 Height Weight   136/88 97.6 °F (36.4 °C) -- 99 15 100 % 5' 7\" (1.702 m) 180 lb (81.6 kg)       General:  NAD. Alert and Oriented. Well-appearing. Skin:  Warm, dry. No rashes. Head:  Normocephalic. Atraumatic. Eyes:  EOMI. Conjunctiva normal.  ENT:  Oral mucosa moist.  Airway patent. Posterior pharynx without erythema, without swelling, without exudate. There are no intraoral lesions. There are no lesions around his lips or to his upper lip. Left side of patient's nose along the bridge and nasolabial fold is erythematous and slightly swollen. Positive tenderness to palpation. There is no discrete collection of fluid or area of fluctuance. There is no raised area that could be incised. I do not appreciate any pustular lesions intranasally either right or left. No lesions along the septum. Neck:  Supple. Normal ROM. Respiratory:  No respiratory distress. No labored breathing. Lungs clear without rales, rhonchi or wheezing. Cardiovascular:  Regular rate. No Murmur. No peripheral edema. Extremities warm and good color. Extremities:  Normal ROM. Nontender to palpation. Atraumatic. Back:  Normal ROM. Nontender to palpation. Neuro:  Alert and Oriented to person, place, time and situation. Normal LOC. Moves all extremities. Speech fluent. Psych:  Calm and Cooperative. Normal thought process. Normal judgement. Lab / Imaging Results   (All laboratory and radiology results have been personally reviewed by myself)  Labs:  No results found for this visit on 03/26/22. Imaging:   All Radiology results interpreted by Radiologist unless otherwise noted. No orders to display       ED Course / Medical Decision Making     Medications   sulfamethoxazole-trimethoprim (BACTRIM DS;SEPTRA DS) 800-160 MG per tablet 2 tablet (has no administration in time range)        Re-examination:  3/26/22       Time:   Patients condition . Consult(s):   None    Procedure(s):   none    MDM:   Did explain to patient that he is probably a carrier of MRSA and he will need to use the mupirocin twice daily for the next couple months. He should follow back up with infectious disease for decolonization recommendations. Plan of Care/Counseling:  Physician Assistant on duty reviewed today's visit with the patient in addition to providing specific details for the plan of care and counseling regarding the diagnosis and prognosis. Questions are answered at this time and are agreeable with the plan. 1735 after Zyvox is prescribed per patient's request and sent to the pharmacy he then tells the nurse that he will not be able to  the medication until Friday because its not covered his by his insurance and he has no money. I then discussed with him other options for treatment of MRSA. He is agreeable to trying Bactrim. ASSESSMENT     1. MRSA nasal colonization    2. Nose cellulitis      PLAN   Discharged home. Patient condition is good    New Medications     New Prescriptions    MUPIROCIN (BACTROBAN) 2 % CREAM    Apply topically 3 times daily. Pharmacy Notation: May substitute ointment if cream not covered by insurance. SULFAMETHOXAZOLE-TRIMETHOPRIM (BACTRIM DS) 800-160 MG PER TABLET    Take 1 tablet by mouth 2 times daily for 10 days     Electronically signed by JAKE Chandler   DD: 3/26/22  **This report was transcribed using voice recognition software. Every effort was made to ensure accuracy; however, inadvertent computerized transcription errors may be present.   END OF ED PROVIDER NOTE       Adriane Chandler  03/26/22 [id number] Komal Grier PA  03/26/22 1739

## 2022-03-29 ENCOUNTER — HOSPITAL ENCOUNTER (OUTPATIENT)
Age: 37
Setting detail: OBSERVATION
Discharge: LEFT BEFORE DISCHARGE | End: 2022-03-30
Attending: EMERGENCY MEDICINE | Admitting: FAMILY MEDICINE
Payer: MEDICAID

## 2022-03-29 DIAGNOSIS — A49.02 MRSA (METHICILLIN RESISTANT STAPHYLOCOCCUS AUREUS) INFECTION: Primary | ICD-10-CM

## 2022-03-29 PROCEDURE — 87070 CULTURE OTHR SPECIMN AEROBIC: CPT

## 2022-03-29 PROCEDURE — 87186 SC STD MICRODIL/AGAR DIL: CPT

## 2022-03-29 PROCEDURE — 87077 CULTURE AEROBIC IDENTIFY: CPT

## 2022-03-29 PROCEDURE — 99285 EMERGENCY DEPT VISIT HI MDM: CPT

## 2022-03-29 ASSESSMENT — PAIN SCALES - GENERAL: PAINLEVEL_OUTOF10: 9

## 2022-03-30 VITALS
DIASTOLIC BLOOD PRESSURE: 55 MMHG | WEIGHT: 180 LBS | HEART RATE: 72 BPM | HEIGHT: 67 IN | OXYGEN SATURATION: 97 % | RESPIRATION RATE: 17 BRPM | TEMPERATURE: 98 F | BODY MASS INDEX: 28.25 KG/M2 | SYSTOLIC BLOOD PRESSURE: 113 MMHG

## 2022-03-30 PROBLEM — A49.02 MRSA (METHICILLIN RESISTANT STAPHYLOCOCCUS AUREUS) INFECTION: Status: ACTIVE | Noted: 2022-03-30

## 2022-03-30 LAB
ALBUMIN SERPL-MCNC: 4.3 G/DL (ref 3.5–5.2)
ALP BLD-CCNC: 71 U/L (ref 40–129)
ALT SERPL-CCNC: 106 U/L (ref 0–40)
ANION GAP SERPL CALCULATED.3IONS-SCNC: 11 MMOL/L (ref 7–16)
AST SERPL-CCNC: 52 U/L (ref 0–39)
BASOPHILS ABSOLUTE: 0.1 E9/L (ref 0–0.2)
BASOPHILS RELATIVE PERCENT: 0.9 % (ref 0–2)
BILIRUB SERPL-MCNC: 0.5 MG/DL (ref 0–1.2)
BUN BLDV-MCNC: 15 MG/DL (ref 6–20)
C-REACTIVE PROTEIN: 7.2 MG/DL (ref 0–0.4)
CALCIUM SERPL-MCNC: 9 MG/DL (ref 8.6–10.2)
CHLORIDE BLD-SCNC: 95 MMOL/L (ref 98–107)
CO2: 26 MMOL/L (ref 22–29)
CREAT SERPL-MCNC: 1.1 MG/DL (ref 0.7–1.2)
EOSINOPHILS ABSOLUTE: 0.14 E9/L (ref 0.05–0.5)
EOSINOPHILS RELATIVE PERCENT: 1.2 % (ref 0–6)
GFR AFRICAN AMERICAN: >60
GFR NON-AFRICAN AMERICAN: >60 ML/MIN/1.73
GLUCOSE BLD-MCNC: 98 MG/DL (ref 74–99)
HCT VFR BLD CALC: 39.5 % (ref 37–54)
HEMOGLOBIN: 13.9 G/DL (ref 12.5–16.5)
IMMATURE GRANULOCYTES #: 0.06 E9/L
IMMATURE GRANULOCYTES %: 0.5 % (ref 0–5)
LACTIC ACID, SEPSIS: 1 MMOL/L (ref 0.5–1.9)
LYMPHOCYTES ABSOLUTE: 3.3 E9/L (ref 1.5–4)
LYMPHOCYTES RELATIVE PERCENT: 28.6 % (ref 20–42)
MCH RBC QN AUTO: 32.2 PG (ref 26–35)
MCHC RBC AUTO-ENTMCNC: 35.2 % (ref 32–34.5)
MCV RBC AUTO: 91.4 FL (ref 80–99.9)
MONOCYTES ABSOLUTE: 0.96 E9/L (ref 0.1–0.95)
MONOCYTES RELATIVE PERCENT: 8.3 % (ref 2–12)
NEUTROPHILS ABSOLUTE: 6.97 E9/L (ref 1.8–7.3)
NEUTROPHILS RELATIVE PERCENT: 60.5 % (ref 43–80)
PDW BLD-RTO: 12.4 FL (ref 11.5–15)
PLATELET # BLD: 295 E9/L (ref 130–450)
PMV BLD AUTO: 9.3 FL (ref 7–12)
POTASSIUM SERPL-SCNC: 3.8 MMOL/L (ref 3.5–5)
RBC # BLD: 4.32 E12/L (ref 3.8–5.8)
SEDIMENTATION RATE, ERYTHROCYTE: 52 MM/HR (ref 0–15)
SODIUM BLD-SCNC: 132 MMOL/L (ref 132–146)
TOTAL PROTEIN: 8.3 G/DL (ref 6.4–8.3)
WBC # BLD: 11.5 E9/L (ref 4.5–11.5)

## 2022-03-30 PROCEDURE — 36415 COLL VENOUS BLD VENIPUNCTURE: CPT

## 2022-03-30 PROCEDURE — 6360000002 HC RX W HCPCS: Performed by: NURSE PRACTITIONER

## 2022-03-30 PROCEDURE — 2580000003 HC RX 258: Performed by: NURSE PRACTITIONER

## 2022-03-30 PROCEDURE — 80053 COMPREHEN METABOLIC PANEL: CPT

## 2022-03-30 PROCEDURE — 83605 ASSAY OF LACTIC ACID: CPT

## 2022-03-30 PROCEDURE — 96365 THER/PROPH/DIAG IV INF INIT: CPT

## 2022-03-30 PROCEDURE — 85651 RBC SED RATE NONAUTOMATED: CPT

## 2022-03-30 PROCEDURE — 85025 COMPLETE CBC W/AUTO DIFF WBC: CPT

## 2022-03-30 PROCEDURE — 87040 BLOOD CULTURE FOR BACTERIA: CPT

## 2022-03-30 PROCEDURE — G0378 HOSPITAL OBSERVATION PER HR: HCPCS

## 2022-03-30 PROCEDURE — 86140 C-REACTIVE PROTEIN: CPT

## 2022-03-30 RX ORDER — 0.9 % SODIUM CHLORIDE 0.9 %
1000 INTRAVENOUS SOLUTION INTRAVENOUS ONCE
Status: COMPLETED | OUTPATIENT
Start: 2022-03-30 | End: 2022-03-30

## 2022-03-30 RX ORDER — SODIUM CHLORIDE 0.9 % (FLUSH) 0.9 %
10 SYRINGE (ML) INJECTION PRN
Status: DISCONTINUED | OUTPATIENT
Start: 2022-03-30 | End: 2022-03-30 | Stop reason: HOSPADM

## 2022-03-30 RX ORDER — ONDANSETRON 2 MG/ML
4 INJECTION INTRAMUSCULAR; INTRAVENOUS EVERY 6 HOURS PRN
Status: DISCONTINUED | OUTPATIENT
Start: 2022-03-30 | End: 2022-03-30 | Stop reason: HOSPADM

## 2022-03-30 RX ORDER — PROMETHAZINE HYDROCHLORIDE 25 MG/1
12.5 TABLET ORAL EVERY 6 HOURS PRN
Status: DISCONTINUED | OUTPATIENT
Start: 2022-03-30 | End: 2022-03-30 | Stop reason: HOSPADM

## 2022-03-30 RX ORDER — FAMOTIDINE 20 MG/1
20 TABLET, FILM COATED ORAL 2 TIMES DAILY
Status: DISCONTINUED | OUTPATIENT
Start: 2022-03-30 | End: 2022-03-30 | Stop reason: HOSPADM

## 2022-03-30 RX ORDER — ACETAMINOPHEN 650 MG/1
650 SUPPOSITORY RECTAL EVERY 6 HOURS PRN
Status: DISCONTINUED | OUTPATIENT
Start: 2022-03-30 | End: 2022-03-30 | Stop reason: HOSPADM

## 2022-03-30 RX ORDER — BUPRENORPHINE HYDROCHLORIDE AND NALOXONE HYDROCHLORIDE DIHYDRATE 8; 2 MG/1; MG/1
1 TABLET SUBLINGUAL DAILY
Status: DISCONTINUED | OUTPATIENT
Start: 2022-03-30 | End: 2022-03-30 | Stop reason: HOSPADM

## 2022-03-30 RX ORDER — ACETAMINOPHEN 325 MG/1
650 TABLET ORAL EVERY 6 HOURS PRN
Status: DISCONTINUED | OUTPATIENT
Start: 2022-03-30 | End: 2022-03-30 | Stop reason: HOSPADM

## 2022-03-30 RX ORDER — LINEZOLID 2 MG/ML
600 INJECTION, SOLUTION INTRAVENOUS ONCE
Status: COMPLETED | OUTPATIENT
Start: 2022-03-30 | End: 2022-03-30

## 2022-03-30 RX ORDER — POLYETHYLENE GLYCOL 3350 17 G/17G
17 POWDER, FOR SOLUTION ORAL DAILY PRN
Status: DISCONTINUED | OUTPATIENT
Start: 2022-03-30 | End: 2022-03-30 | Stop reason: HOSPADM

## 2022-03-30 RX ORDER — SODIUM CHLORIDE 0.9 % (FLUSH) 0.9 %
10 SYRINGE (ML) INJECTION EVERY 12 HOURS SCHEDULED
Status: DISCONTINUED | OUTPATIENT
Start: 2022-03-30 | End: 2022-03-30 | Stop reason: HOSPADM

## 2022-03-30 RX ORDER — SODIUM CHLORIDE 9 MG/ML
INJECTION, SOLUTION INTRAVENOUS PRN
Status: DISCONTINUED | OUTPATIENT
Start: 2022-03-30 | End: 2022-03-30 | Stop reason: HOSPADM

## 2022-03-30 RX ORDER — LINEZOLID 2 MG/ML
600 INJECTION, SOLUTION INTRAVENOUS EVERY 12 HOURS
Status: DISCONTINUED | OUTPATIENT
Start: 2022-03-30 | End: 2022-03-30 | Stop reason: HOSPADM

## 2022-03-30 RX ADMIN — SODIUM CHLORIDE 1000 ML: 9 INJECTION, SOLUTION INTRAVENOUS at 02:01

## 2022-03-30 RX ADMIN — LINEZOLID 600 MG: 600 INJECTION, SOLUTION INTRAVENOUS at 00:22

## 2022-03-30 NOTE — ED NOTES
Perfect serve sent to Dr Gordy Villalobos and Dr Jocelynn Coley regarding patient wanting to leave AMA; explained risks and benefits to patient and that he is to wait for physician to see him; he verbalized understanding but refused to wait; AMA paper signed.      Adrian Amaya RN  03/30/22 0210

## 2022-03-30 NOTE — PROGRESS NOTES
Paged by nurse that pt wished to leave AMA. I went to assess pt within 35 minutes, but he was already gone.

## 2022-03-30 NOTE — ED PROVIDER NOTES
ED Physician   HPI:  3/29/22, Time: 11:56 PM EDT         Pardeep Page is a 39 y.o. male presenting to the ED for concerns regarding MRSA to his nares. Patient reports that over the last 3 to 4 days he has had increased swelling and redness to his nares, reports that its to the turbinates as well as the outer aspect. He does report a history of MRSA and most recently had a another recurrence in February 2022 and at that time was being followed by infectious disease and was eventually placed on Zyvox for treatment. Patient reports he most likely got it from shaving his nose hairs. He states that he feels as if he must the nixed the tissue, and days later the erythema and swelling began. Patient does report subjective fevers and chills. He also reports increased pain and pressure. Patient did present to an outside emergency department and was started on Bactrim DS as well as Bactroban ointment. Patient reports that he is not getting any relief and came here for further treatment. Patient otherwise denies any chest pain, shortness of breath or abdominal pain he also does not have any nausea, vomiting or diarrhea. Patient reports pain and swelling predominantly to the left nares. Patient with symptoms moderate in severity and persistent  Review of Systems:   A complete review of systems was performed and pertinent positives and negatives are stated within HPI, all other systems reviewed and are negative.          --------------------------------------------- PAST HISTORY ---------------------------------------------  Past Medical History:  has a past medical history of Cervical back pain with evidence of disc disease, Migraines, and Ruptured lumbar disc. Past Surgical History:  has no past surgical history on file. Social History:  reports that he has been smoking. He has been smoking about 0.50 packs per day.  He has never used smokeless tobacco. He reports that he does not drink alcohol and does not use drugs. Family History: family history is not on file. The patients home medications have been reviewed.     Allergies: Pcn [penicillins]    -------------------------------------------------- RESULTS -------------------------------------------------  All laboratory and radiology results have been personally reviewed by myself   LABS:  Results for orders placed or performed during the hospital encounter of 03/29/22   CBC with Auto Differential   Result Value Ref Range    WBC 11.5 4.5 - 11.5 E9/L    RBC 4.32 3.80 - 5.80 E12/L    Hemoglobin 13.9 12.5 - 16.5 g/dL    Hematocrit 39.5 37.0 - 54.0 %    MCV 91.4 80.0 - 99.9 fL    MCH 32.2 26.0 - 35.0 pg    MCHC 35.2 (H) 32.0 - 34.5 %    RDW 12.4 11.5 - 15.0 fL    Platelets 608 766 - 705 E9/L    MPV 9.3 7.0 - 12.0 fL    Neutrophils % 60.5 43.0 - 80.0 %    Immature Granulocytes % 0.5 0.0 - 5.0 %    Lymphocytes % 28.6 20.0 - 42.0 %    Monocytes % 8.3 2.0 - 12.0 %    Eosinophils % 1.2 0.0 - 6.0 %    Basophils % 0.9 0.0 - 2.0 %    Neutrophils Absolute 6.97 1.80 - 7.30 E9/L    Immature Granulocytes # 0.06 E9/L    Lymphocytes Absolute 3.30 1.50 - 4.00 E9/L    Monocytes Absolute 0.96 (H) 0.10 - 0.95 E9/L    Eosinophils Absolute 0.14 0.05 - 0.50 E9/L    Basophils Absolute 0.10 0.00 - 0.20 E9/L   Comprehensive Metabolic Panel   Result Value Ref Range    Sodium 132 132 - 146 mmol/L    Potassium 3.8 3.5 - 5.0 mmol/L    Chloride 95 (L) 98 - 107 mmol/L    CO2 26 22 - 29 mmol/L    Anion Gap 11 7 - 16 mmol/L    Glucose 98 74 - 99 mg/dL    BUN 15 6 - 20 mg/dL    CREATININE 1.1 0.7 - 1.2 mg/dL    GFR Non-African American >60 >=60 mL/min/1.73    GFR African American >60     Calcium 9.0 8.6 - 10.2 mg/dL    Total Protein 8.3 6.4 - 8.3 g/dL    Albumin 4.3 3.5 - 5.2 g/dL    Total Bilirubin 0.5 0.0 - 1.2 mg/dL    Alkaline Phosphatase 71 40 - 129 U/L     (H) 0 - 40 U/L    AST 52 (H) 0 - 39 U/L   Sedimentation Rate   Result Value Ref Range    Sed Rate 52 (H) 0 - 15 mm/Hr C-Reactive Protein   Result Value Ref Range    CRP 7.2 (H) 0.0 - 0.4 mg/dL   Lactate, Sepsis   Result Value Ref Range    Lactic Acid, Sepsis 1.0 0.5 - 1.9 mmol/L       RADIOLOGY:  Interpreted by Radiologist.  No orders to display       ------------------------- NURSING NOTES AND VITALS REVIEWED ---------------------------   The nursing notes within the ED encounter and vital signs as below have been reviewed. BP (!) 165/103   Pulse 111   Temp 98.6 °F (37 °C) (Oral)   Resp 16   Ht 5' 7\" (1.702 m)   Wt 180 lb (81.6 kg)   SpO2 98%   BMI 28.19 kg/m²   Oxygen Saturation Interpretation: Normal      ---------------------------------------------------PHYSICAL EXAM--------------------------------------      Constitutional/General: Alert and oriented x3, moderately uncomfortable  Head: Normocephalic and atraumatic  Eyes: PERRL, EOMI  Mouth: Oropharynx clear, handling secretions, no trismus  Neck: Supple, full ROM,   Pulmonary: Lungs clear to auscultation bilaterally, no wheezes, rales, or rhonchi. Not in respiratory distress  Cardiovascular:  Regular rate and rhythm, no murmurs, gallops, or rubs. 2+ distal pulses  Abdomen: Soft, non tender, non distended,   Extremities: Moves all extremities x 4. Warm and well perfused  Skin: warm and dry without rash, bright erythema to nares with area of crusted loculated area to the left nasal turbinate. Please refer to picture. Neurologic: GCS 15, cranial nerves II through XII grossly intact. No acute neurovascular deficit noted. Speech clear and coherent strength strong and equal bilaterally. Negative for meningeal irritation. Psych: Normal Affect      **Informed Consent**    The patient has given verbal consent to have photos taken of Nares and electronically inserted into their ED Provider Note as part of their permanent medical record for purposes of illustration, documentation, treatment management and/or medical review.      All Images taken on 3/29/22 of patient name: Larissa Waldron were taken by a [address] [facility] approved registered mobile device via [employer] mobile application and transmitted then stored on a secured [employer] Site located within [facility]. ------------------------------ ED COURSE/MEDICAL DECISION MAKING----------------------  Medications   0.9 % sodium chloride bolus (has no administration in time range)   linezolid (ZYVOX) IVPB 600 mg (600 mg IntraVENous New Bag 3/30/22 0022)         ED COURSE:       Medical Decision Making:    Plan will be for labs will also obtain MRSA swab of the nares as well as start patient on IV Zyvox. Labs resulted CBC negative, chemistry panel unremarkable except for slight elevation in liver enzymes,  AST 52. CRP elevated at 7.2. Lactic acid level negative . Sed rate also elevated at 52 blood cultures x2 are pending. Nasal culture also pending. Patient with marked improvement in vitals, /72, heart rate 85, pulse ox 95%. Patient is resting comfortably. Cultures are pending. I did speak with covering provider, they are agreeable to the patient with concerns for staph infection. Counseling: The emergency provider has spoken with the patient and discussed todays results, in addition to providing specific details for the plan of care and counseling regarding the diagnosis and prognosis. Questions are answered at this time and they are agreeable with the plan.      --------------------------------- IMPRESSION AND DISPOSITION ---------------------------------    IMPRESSION  1. MRSA (methicillin resistant Staphylococcus aureus) infection        DISPOSITION  Disposition: Admit to med/surg floor  Patient condition is good      NOTE: This report was transcribed using voice recognition software.  Every effort was made to ensure accuracy; however, inadvertent computerized transcription errors may be present     REMI Hill - CNP  03/30/22 0158

## 2022-03-30 NOTE — H&P
Hospitalist History & Physical      PCP: No primary care provider on file. Date of Service: Pt seen/examined on 3/30/2022     Chief Complaint:  had concerns including Wound Check (Patient to the ED with a hisotry of MRSA, patient states he belives he now hows MRSA in his left Shashank). History Of Present Illness:    Mr. Pardeep Page, a 39y.o. year old male  who  has a past medical history of Cervical back pain with evidence of disc disease, Migraines, and Ruptured lumbar disc. Pardeep Page is a 39 y.o. male presenting to the ED for concerns regarding MRSA to his nares. Patient reports that over the last 3 to 4 days he has had increased swelling and redness to his nares, reports that its to the turbinates as well as the outer aspect. He does report a history of MRSA and most recently had a another recurrence in February 2022 and at that time was being followed by infectious disease and was eventually placed on Zyvox for treatment. Patient reports he most likely got it from shaving his nose hairs. He states that he feels as if he must the nixed the tissue, and days later the erythema and swelling began. Patient does report subjective fevers and chills. He also reports increased pain and pressure. Patient did present to an outside emergency department and was started on Bactrim DS as well as Bactroban ointment. Patient reports that he is not getting any relief and came here for further treatment. Patient otherwise denies any chest pain, shortness of breath or abdominal pain he also does not have any nausea, vomiting or diarrhea. Patient reports pain and swelling predominantly to the left nares. Patient with symptoms moderate in severity and persistent      Past Medical History:   Diagnosis Date    Cervical back pain with evidence of disc disease     Migraines     Ruptured lumbar disc        No past surgical history on file.     Prior to Admission medications    Medication Sig Start Date End Date Taking? Authorizing Provider   mupirocin (BACTROBAN) 2 % cream Apply topically 3 times daily. Pharmacy Notation: May substitute ointment if cream not covered by insurance. 3/26/22 4/25/22  JAKE Coelho   sulfamethoxazole-trimethoprim (BACTRIM DS) 800-160 MG per tablet Take 1 tablet by mouth 2 times daily for 10 days 3/26/22 4/5/22  JAKE Coelho   buprenorphine-naloxone (SUBOXONE) 8-2 MG FILM SL film Place 1 Film under the tongue daily. Samson Quevedo Historical Provider, MD   famotidine (PEPCID) 20 MG tablet Take 1 tablet by mouth 2 times daily for 7 days 9/4/18 9/11/18  Alicia Foster MD         Allergies:  Pcn [penicillins]    Social History:    TOBACCO:   reports that he has been smoking. He has been smoking about 0.50 packs per day. He has never used smokeless tobacco.  ETOH:   reports no history of alcohol use. Family History:    Reviewed in detail and negative for DM, CAD, Cancer, CVA. Positive as follows\"  No family history on file. REVIEW OF SYSTEMS:   Pertinent positives as noted in the HPI. All other systems reviewed and negative. PHYSICAL EXAM:  /72   Pulse 85   Temp 98 °F (36.7 °C)   Resp 17   Ht 5' 7\" (1.702 m)   Wt 180 lb (81.6 kg)   SpO2 95%   BMI 28.19 kg/m²   General appearance: No acute distress, sleeping  HEENT: Normal cephalic, atraumatic without obvious deformity  Neck: Supple, with full range of motion. No jugular venous distention. Trachea midline.   Respiratory: CTA  Cardiovascular: RRR  Abdomen: S/ND  Musculoskeletal: No deformities  Skin: Nose erythemic crusted lesion on left nasal turbinate  Neurologic: Sleeping, would not awake for exam      CBC:   Recent Labs     03/30/22  0008   WBC 11.5   RBC 4.32   HGB 13.9   HCT 39.5   MCV 91.4   RDW 12.4        BMP:   Recent Labs     03/30/22 0008      K 3.8   CL 95*   CO2 26   BUN 15   CREATININE 1.1     LFT:  Recent Labs     03/30/22 0008   PROT 8.3   ALKPHOS 71   *   AST 52*   BILITOT 0.5 CE:  No results for input(s): Darwin Clap in the last 72 hours. PT/INR: No results for input(s): INR, APTT in the last 72 hours. BNP: No results for input(s): BNP in the last 72 hours. ESR:   Lab Results   Component Value Date    SEDRATE 52 (H) 03/30/2022     CRP:   Lab Results   Component Value Date    CRP 7.2 (H) 03/30/2022     D Dimer: No results found for: DDIMER   Folate and B12: No results found for: [id number], No results found for: FOLATE  Lactic Acid:   Lab Results   Component Value Date    LACTA 1.7 01/30/2022     Thyroid Studies:   Lab Results   Component Value Date    TSH 0.557 01/17/2014    V1FJDVK 7.7 01/17/2014       Oupatient labs:  Lab Results   Component Value Date    TSH 0.557 01/17/2014       Urinalysis:    Lab Results   Component Value Date    NITRU NEGATIVE 01/17/2014    WBCUA 1-3 01/17/2014    BACTERIA NONE 01/17/2014    RBCUA NONE 01/17/2014    BLOODU NEGATIVE 01/17/2014    SPECGRAV 1.020 01/17/2014    GLUCOSEU NEGATIVE 01/17/2014       Imaging:  No results found. ASSESSMENT:  -MRSA infection of the nose      PLAN:  -Admit to medicine  -Consult infectious disease  -Zyvox        Diet: No diet orders on file  Code Status: No Order  Surrogate decision maker confirmed with patient:   Extended Emergency Contact Information  Primary Emergency Contact: [address] [place] [address] Phone: [phone]  Mobile Phone: [phone]  Relation: Brother/Sister    DVT Prophylaxis: []Lovenox []Heparin []PCD [] [address] []Encouraged ambulation  Disposition: []Med/Surg [] Intermediate [] ICU/CCU  Admit status: [] Observation [] Inpatient     +++++++++++++++++++++++++++++++++++++++++++++++++  Chen Dine, DO  +++++++++++++++++++++++++++++++++++++++++++++++++  NOTE: This report was transcribed using voice recognition software. Every effort was made to ensure accuracy; however, inadvertent computerized transcription errors may be present.

## 2022-04-01 LAB
CULTURE NOSE: ABNORMAL
ORGANISM: ABNORMAL

## 2022-04-03 NOTE — ED NOTES
Reviewed patients after hours culture results. Nasal culture growing MRSA, patient left AMA from the ED on 3/30/22. Discussed with Dr. Chalo Vera, will call in Rx for doxycycline 100 mg PO BID x 14 days for treatment. Call attempt #1 on 4/3, [facility] for call back.      Nida Ghotra, PharmD, BCCCP 4/3/2022 Fareed Jasso [facility] Specialist, Emergency Medicine  [phone]

## 2022-04-04 LAB
BLOOD CULTURE, ROUTINE: NORMAL
CULTURE, BLOOD 2: NORMAL